# Patient Record
Sex: MALE | Race: WHITE | NOT HISPANIC OR LATINO | Employment: OTHER | URBAN - NONMETROPOLITAN AREA
[De-identification: names, ages, dates, MRNs, and addresses within clinical notes are randomized per-mention and may not be internally consistent; named-entity substitution may affect disease eponyms.]

---

## 2017-11-09 ENCOUNTER — OFFICE VISIT (OUTPATIENT)
Dept: SLEEP MEDICINE | Facility: HOSPITAL | Age: 77
End: 2017-11-09

## 2017-11-09 VITALS
DIASTOLIC BLOOD PRESSURE: 62 MMHG | SYSTOLIC BLOOD PRESSURE: 120 MMHG | HEIGHT: 68 IN | BODY MASS INDEX: 32.89 KG/M2 | WEIGHT: 217 LBS

## 2017-11-09 DIAGNOSIS — G47.33 OBSTRUCTIVE SLEEP APNEA, ADULT: Primary | ICD-10-CM

## 2017-11-09 PROCEDURE — 99213 OFFICE O/P EST LOW 20 MIN: CPT | Performed by: INTERNAL MEDICINE

## 2017-11-09 NOTE — PROGRESS NOTES
Sleep Clinic Follow Up    Date: 11/9/2017  Primary Care Physician: Pedro Capone MD      Interim History (1/3):  Since the last visit on 11/08/2016, patient has:      1)  CHAO - Has remained compliant with CPAP. He denies mask and machine issues, dry mouth, headaches, pressures intolerance, or non-compliance. He denies abnormal dreams, sleep paralysis, hypnagogic hallucinations, or cataplexy symptoms.     PAP Data:  Time frame: 06/07/2017 - 11/07/2017   Compliance 100 %  PAP range : 9 cm H2O  Average 90% pressure: 9 cmH2O  Leak: 0   Average AHI 0.8 events/hr  Mask type: nasal  DME: [facility]    Bed time: 5215-2047  Sleep latency: 30 minutes  Number of times awakens during the night: 0-2  Wake time: 9829-6596  Estimated total sleep time at night: 6 hours  Caffeine intake: 1-3 drinks  Alcohol intake: none  Nap time: none  Sleepiness with Driving: none    [place] - 4    PMHx, FH, SH reviewed and pertinent changes are:  unchanged from last office visit on 11/08/2016      REVIEW OF SYSTEMS:   Negative for chest pain, fever, chills, SOA, abdominal pain. Smoking: none      Exam (6-11/12):    Vitals:    11/09/17 1604   BP: 120/62     Body mass index is 32.99 kg/(m^2).  Gen:  No distress, conversant, pleasant, appears stated age, alert, oriented  Eyes:   Anicteric sclera, moist conjunctiva, no lid lag     PERRLA, EOMI   Heent:   NC/AT    Oropharynx clear, Mallampati 4    Grossly normal hearing    Lungs:  Normal effort, non-labored breathing    Clear to auscultation    CV:  Normal S1/S2, no murmur    no lower extremity edema  ABD:  Soft, normal bowel sounds       Psych:  Appropriate affect  Neuro:  CN 2-12 intact    Past Medical History:   Diagnosis Date   • Asthma    • Hypertension    • CHAO on CPAP        Current Outpatient Prescriptions:   •  atenolol (TENORMIN) 50 MG tablet, , Disp: , Rfl:   •  diazePAM (VALIUM) 2 MG tablet, , Disp: , Rfl:   •  fluticasone (FLONASE) 50 MCG/ACT nasal spray, , Disp: , Rfl:   •  tamsulosin  (FLOMAX) 0.4 MG capsule 24 hr capsule, , Disp: , Rfl:       ASSESSMENT / PLAN:     1. Obstructive sleep apnea  1. PSG on 05/08/20111, AHI of 38  2. CPAP titration on same day, recommended 9 cm H2O  3. Currently on 9 cm H2O  4. Continue PAP as prescribed.   5. Script for PAP supplies  6. Return to clinic in 1 year with compliance check unless sx change in the interim period.      Total time 15 min, more than half spent in face to face counseling and coordination of care.     This document has been electronically signed by David Sweeney MD on November 9, 2017         CC: Pedro Capone MD          No ref. provider found

## 2019-01-17 ENCOUNTER — OFFICE VISIT (OUTPATIENT)
Dept: SLEEP MEDICINE | Facility: HOSPITAL | Age: 79
End: 2019-01-17

## 2019-01-17 VITALS
BODY MASS INDEX: 32.13 KG/M2 | SYSTOLIC BLOOD PRESSURE: 132 MMHG | HEIGHT: 68 IN | DIASTOLIC BLOOD PRESSURE: 71 MMHG | HEART RATE: 69 BPM | WEIGHT: 212 LBS | OXYGEN SATURATION: 95 %

## 2019-01-17 DIAGNOSIS — G47.33 OBSTRUCTIVE SLEEP APNEA, ADULT: Primary | ICD-10-CM

## 2019-01-17 PROCEDURE — 99213 OFFICE O/P EST LOW 20 MIN: CPT | Performed by: INTERNAL MEDICINE

## 2019-01-17 RX ORDER — ATORVASTATIN CALCIUM 10 MG/1
10 TABLET, FILM COATED ORAL DAILY
COMMUNITY

## 2019-01-17 NOTE — PROGRESS NOTES
Sleep Clinic Follow Up    Date: 2019  Primary Care Physician: Pedro Capone MD    Last office visit: 2017 (I reviewed this note)    CC: Follow up: CHAO    Sleep Testin. PSG on , AHI of 38  2. CPAP titration on same day, recommended 9 cm H2O  3. Currently on 9 cm H2O      Assessment and Plan:    1. Obstructive sleep apnea Established, stable (1)  1. Compliant and improved with PAP therapy  2. Continue PAP as prescribed.   3. Script for PAP supplies  2. DM type 2  1. stable      Interim History (1-3/4):  Since the last visit:    1) severe CHAO -  Marcelo Araya Jr. has remained compliant with CPAP. He denies mask and machine issues, dry mouth, headaches, or pressures intolerance. He denies abnormal dreams, sleep paralysis, nasal congestion, URI sx.    PAP Data:    Time frame: 2018 - 2019   Compliance 99.6 %  Average use on days used: 7hrs 43 min  Percent of days with usage greater than or equal to 4 hours: 99.6%  PAP range : 9 cm H2O  Average 90% pressure: 9 cmH2O  Leak: <1 minutes  Average AHI 1.0 events/hr  Mask type: Full face mask  [facility]:     Bed time: 2300  Sleep latency: 60 minutes  Number of times awakens during the night: 1-2  Wake time: 2485-9587  Estimated total sleep time at night: 5-8 hours  Caffeine intake: 8oz of coffee, 24oz of tea, and 2oz of soda  Alcohol intake: 0 drinks per week  Nap time: 3x per week  Sleepiness with Driving: rare    [place] - 3    2) Patient denies RLS symptoms.     PMHx, FH, SH reviewed and pertinent changes are: started on lipitor, diagnosed with DM    REVIEW OF SYSTEMS:   Negative for chest pain, fever, cough, SOA, abdominal pain. Smoking:none      Exam ():  Vitals:    19 1447   BP: 132/71   Pulse: 69   SpO2: 95%     Body mass index is 32.24 kg/m². Patient's Body mass index is 32.24 kg/m². BMI is above normal parameters. Recommendations include: referral to primary care.      Gen:  No acute distress, alert,  oriented  Lungs:  CTA with normal effort   CV:  RRR, no M/R/G  GI:  soft, non-tender  Psych:  Appropriate affect        Past Medical History:   Diagnosis Date   • Asthma    • Hypertension    • CHAO on CPAP        Current Outpatient Medications:   •  atenolol (TENORMIN) 50 MG tablet, , Disp: , Rfl:   •  atorvastatin (LIPITOR) 10 MG tablet, Take 10 mg by mouth Daily., Disp: , Rfl:   •  diazePAM (VALIUM) 2 MG tablet, , Disp: , Rfl:   •  fluticasone (FLONASE) 50 MCG/ACT nasal spray, , Disp: , Rfl:   •  tamsulosin (FLOMAX) 0.4 MG capsule 24 hr capsule, , Disp: , Rfl:     Total time 15 min, more than half spent in face to face counseling and coordination of care.    RTC in 12 months     This document has been electronically signed by David Sweeney MD on January 17, 2019         CC: Pedro Capone MD          No ref. provider found

## 2020-01-28 ENCOUNTER — OFFICE VISIT (OUTPATIENT)
Dept: SLEEP MEDICINE | Facility: HOSPITAL | Age: 80
End: 2020-01-28

## 2020-01-28 VITALS
SYSTOLIC BLOOD PRESSURE: 109 MMHG | OXYGEN SATURATION: 98 % | BODY MASS INDEX: 28.64 KG/M2 | HEART RATE: 51 BPM | HEIGHT: 68 IN | DIASTOLIC BLOOD PRESSURE: 59 MMHG | WEIGHT: 189 LBS

## 2020-01-28 DIAGNOSIS — G47.33 OBSTRUCTIVE SLEEP APNEA, ADULT: Primary | ICD-10-CM

## 2020-01-28 PROCEDURE — 99213 OFFICE O/P EST LOW 20 MIN: CPT | Performed by: NURSE PRACTITIONER

## 2020-01-28 RX ORDER — ALBUTEROL SULFATE 90 UG/1
AEROSOL, METERED RESPIRATORY (INHALATION)
COMMUNITY
Start: 2020-01-02

## 2020-01-28 RX ORDER — RANITIDINE 150 MG/1
TABLET ORAL
COMMUNITY
Start: 2019-12-02

## 2020-01-28 RX ORDER — MECLIZINE HYDROCHLORIDE 25 MG/1
TABLET ORAL
COMMUNITY
Start: 2020-01-02

## 2020-01-28 RX ORDER — BUSPIRONE HYDROCHLORIDE 15 MG/1
TABLET ORAL
COMMUNITY
Start: 2019-12-02

## 2020-01-28 NOTE — PROGRESS NOTES
Sleep Clinic Follow Up    Date: 2020  Primary Care Physician: Pedro Capone MD    Last office visit: 2019 (I reviewed this note)    CC: Follow up: CHAO on CPAP      Interim History:  Since the last visit:    1) severe CHAO -  Marcelo Araya Jr. has remained compliant with CPAP. He denies mask and machine issues, dry mouth, headaches, or pressures intolerance. He denies abnormal dreams, sleep paralysis, nasal congestion, URI sx.    Sleep Testin. PSG on 2011, AHI of 38   2. CPAP titration on same day recommended 9 cm H2O   3. Currently on 9 cm H2O    PAP Data:    Time frame: 2019-2020   Compliance 99.7 %  Average use on days used: 7 hrs 27 min  Percent of days with usage greater than or equal to 4 hours: 99.2%  PAP range : 9 cm H2O  Average 90% pressure: 9 cmH2O  Leak: <1 minutes  Average AHI 2.5 events/hr  Mask type: Full face mask  [facility]:     Bed time: 9362-5758  Sleep latency: 30 minutes  Number of times awakens during the night: 2  Wake time: 0700  Estimated total sleep time at night: 6-8 hours  Caffeine intake: 1 cups of coffee, 3 cups of tea, and 0-1 diet sodas per day  Alcohol intake: 0 drinks per week  Nap time: occasionally, especially on Sundays   Sleepiness with Driving: none       2) Patient denies RLS symptoms.     PMHx, FH, SH reviewed and pertinent changes are: unchanged from last office visit on 2019 - other than asthma flare up      REVIEW OF SYSTEMS:   +SOB - at baseline  Negative for chest pain, SOA, fever, chills, cough, N/V/D, abdominal pain.    Smoking:none        Exam:  Vitals:    20 1118   BP: 109/59   Pulse: 51   SpO2: 98%           20  1118   Weight: 85.7 kg (189 lb)     Body mass index is 28.74 kg/m². Patient's Body mass index is 28.74 kg/m². BMI is above normal parameters. Recommendations include: referral to primary care.      Gen:                No distress, conversant, pleasant, appears stated age, alert, oriented  Eyes:                Anicteric sclera, moist conjunctiva, no lid lag                           PERRL, EOMI   Heent:             NC/AT                          Oropharynx clear                          Normal hearing  Lungs:             Normal effort, non-labored breathing                          Clear to auscultation bilaterally          CV:                  Normal S1/S2, no murmur                          No lower extremity edema  ABD:               Soft, rounded, non-distended                          Normal bowel sounds                    Psych:             Appropriate affect  Neuro:             CN 2-12 appear intact    Past Medical History:   Diagnosis Date   • Asthma    • Hypertension    • CHAO on CPAP        Current Outpatient Medications:   •  tiotropium bromide monohydrate (SPIRIVA RESPIMAT) 2.5 MCG/ACT aerosol solution inhaler, Inhale 2 puffs Daily., Disp: , Rfl:   •  atenolol (TENORMIN) 50 MG tablet, , Disp: , Rfl:   •  atorvastatin (LIPITOR) 10 MG tablet, Take 10 mg by mouth Daily., Disp: , Rfl:   •  busPIRone (BUSPAR) 15 MG tablet, , Disp: , Rfl:   •  diazePAM (VALIUM) 2 MG tablet, , Disp: , Rfl:   •  fluticasone (FLONASE) 50 MCG/ACT nasal spray, , Disp: , Rfl:   •  meclizine (ANTIVERT) 25 MG tablet, , Disp: , Rfl:   •  raNITIdine (ZANTAC) 150 MG tablet, , Disp: , Rfl:   •  tamsulosin (FLOMAX) 0.4 MG capsule 24 hr capsule, , Disp: , Rfl:   •  VENTOLIN  (90 Base) MCG/ACT inhaler, , Disp: , Rfl:       Assessment and Plan:    1. Obstructive sleep apnea Established, stable (1)  1. Compliant with PAP therapy  2. Continue PAP as prescribed  3. Script for PAP supplies  4. Discussed mask liners  5. Return to clinic in 1 year with compliance report unless change in symptoms in interim period        8 of 15 minutes spent face-to-face counseling patient extensively regarding:   PAP therapy, PAP compliance and PAP maintenance      RTC in 12 months. Patient agrees to return sooner if changes in symptoms.        This  document has been electronically signed by ABEL Cheng on January 28, 2020 11:22 AM          CC: Pedro Capone MD          No ref. provider found

## 2021-01-27 ENCOUNTER — OFFICE VISIT (OUTPATIENT)
Dept: SLEEP MEDICINE | Facility: HOSPITAL | Age: 81
End: 2021-01-27

## 2021-01-27 DIAGNOSIS — G47.33 OBSTRUCTIVE SLEEP APNEA, ADULT: Primary | ICD-10-CM

## 2021-01-27 PROCEDURE — 99441 PR PHYS/QHP TELEPHONE EVALUATION 5-10 MIN: CPT | Performed by: NURSE PRACTITIONER

## 2021-01-27 NOTE — PROGRESS NOTES
Sleep Clinic Follow Up - Telephone Visit      You have chosen to receive care through a telephone visit. Do you consent to use a telephone visit for your medical care today? Yes    Date: 2021  Primary Care Provider: Pedro Capone MD    Last office visit: 2020 (I reviewed this note)    CC: Follow up: CHAO on CPAP      Interim History:  Since the last visit:    1) severe CHAO -  Marcelo Araya Jr. has reportedly remained compliant with CPAP. He denies mask and machine issues, dry mouth, headaches, or pressures intolerance. He denies abnormal dreams, sleep paralysis, nasal congestion, URI sx.    Sleep Testin. PSG on 2011, AHI of 38   2. CPAP titration on same day, recommended 9 cm H2O   3. Currently on 9 cm H2O    PAP Data:  Patient to take data card to DME   Mask type: Full face mask  DME:       Bed time: 5868-7831  Sleep latency: 0-5 minutes  Number of times awakens during the night: 2  Wake time: 6080-8566  Estimated total sleep time at night: 7-8 hours  Caffeine intake: 0 cups of coffee, 0 cups of tea, and 0 sodas per day  Alcohol intake: 0 drinks per week  Nap time: some days, up to one hour    Sleepiness with Driving: denies          2) Patient denies RLS symptoms.     PMHx, FH, SH reviewed and pertinent changes are:  unchanged from last office visit on 2020      REVIEW OF SYSTEMS:   Negative for chest pain, SOA, fever, chills, cough, N/V/D, abdominal pain.    Smoking:none         Exam:  Unable to perform physical exam due to conducting telephone visit    Past Medical History:   Diagnosis Date   • Asthma    • Hypertension    • CHAO on CPAP        Current Outpatient Medications:   •  atenolol (TENORMIN) 50 MG tablet, , Disp: , Rfl:   •  atorvastatin (LIPITOR) 10 MG tablet, Take 10 mg by mouth Daily., Disp: , Rfl:   •  busPIRone (BUSPAR) 15 MG tablet, , Disp: , Rfl:   •  diazePAM (VALIUM) 2 MG tablet, , Disp: , Rfl:   •  fluticasone (FLONASE) 50 MCG/ACT nasal spray, , Disp: ,  Rfl:   •  meclizine (ANTIVERT) 25 MG tablet, , Disp: , Rfl:   •  raNITIdine (ZANTAC) 150 MG tablet, , Disp: , Rfl:   •  tamsulosin (FLOMAX) 0.4 MG capsule 24 hr capsule, , Disp: , Rfl:   •  tiotropium bromide monohydrate (SPIRIVA RESPIMAT) 2.5 MCG/ACT aerosol solution inhaler, Inhale 2 puffs Daily., Disp: , Rfl:   •  VENTOLIN  (90 Base) MCG/ACT inhaler, , Disp: , Rfl:       Assessment and Plan:    1. Obstructive sleep apnea - Established, stable (1)  1. Reportedly compliant with PAP therapy- patient to take data card to DME   2. Continue PAP as prescribed  3. Script for PAP supplies  4. Return to clinic in 12 months with compliance report unless change in symptoms in interim period        This visit has been rescheduled as a phone visit to comply with patient safety concerns in accordance with CDC recommendations. Total time of discussion was 5 minutes.    3 of 5 minutes spent telephone counseling patient extensively regarding:   PAP therapy, PAP compliance and PAP maintenance      RTC in 12 months. Patient agrees to return sooner if changes in symptoms.            This document has been electronically signed by ABEL Romeo on January 27, 2021 10:09 CST              CC: Pedro Capone MD          No ref. provider found

## 2021-02-05 ENCOUNTER — DOCUMENTATION (OUTPATIENT)
Dept: SLEEP MEDICINE | Facility: HOSPITAL | Age: 81
End: 2021-02-05

## 2021-02-05 NOTE — PROGRESS NOTES
PAP Data:    Time frame: 03/21/2020-04/19/2020   Compliance 100 %  Average use on days used: 7hrs 22 min  Percent of days with usage greater than or equal to 4 hours: 100%  PAP range : 9 cm H2O  Average 90% pressure: 9 cmH2O  Leak: 2 minutes  Average AHI 1.8 events/hr      This is the only pap data available at this time

## 2021-02-12 ENCOUNTER — DOCUMENTATION (OUTPATIENT)
Dept: SLEEP MEDICINE | Facility: HOSPITAL | Age: 81
End: 2021-02-12

## 2021-02-13 NOTE — PROGRESS NOTES
Did receive for compliance.  Unfortunately did with some history of but was received previously from the end of March to the middle of April 2020.  100% compliance with 100% 4 hours use with unremarkable residual AHI and unremarkable leak.  Unfortunately no more recent data.    We will have staff reach out to obtain compliance report.    Denny Baptiste II, MD  02/12/21 @ 6:13 PM CST

## 2021-03-18 ENCOUNTER — APPOINTMENT (OUTPATIENT)
Dept: VACCINE CLINIC | Facility: HOSPITAL | Age: 81
End: 2021-03-18

## 2021-03-19 ENCOUNTER — IMMUNIZATION (OUTPATIENT)
Dept: VACCINE CLINIC | Facility: HOSPITAL | Age: 81
End: 2021-03-19

## 2021-03-19 PROCEDURE — 91300 HC SARSCOV02 VAC 30MCG/0.3ML IM: CPT | Performed by: NURSE PRACTITIONER

## 2021-03-19 PROCEDURE — 0001A: CPT | Performed by: NURSE PRACTITIONER

## 2021-04-09 ENCOUNTER — IMMUNIZATION (OUTPATIENT)
Dept: VACCINE CLINIC | Facility: HOSPITAL | Age: 81
End: 2021-04-09

## 2021-04-09 PROCEDURE — 0002A: CPT | Performed by: THORACIC SURGERY (CARDIOTHORACIC VASCULAR SURGERY)

## 2021-04-09 PROCEDURE — 91300 HC SARSCOV02 VAC 30MCG/0.3ML IM: CPT | Performed by: THORACIC SURGERY (CARDIOTHORACIC VASCULAR SURGERY)

## 2021-07-20 DIAGNOSIS — G47.33 OSA (OBSTRUCTIVE SLEEP APNEA): Primary | ICD-10-CM

## 2021-07-30 DIAGNOSIS — G47.33 OSA (OBSTRUCTIVE SLEEP APNEA): Primary | ICD-10-CM

## 2022-01-17 ENCOUNTER — OFFICE VISIT (OUTPATIENT)
Dept: SLEEP MEDICINE | Facility: HOSPITAL | Age: 82
End: 2022-01-17

## 2022-01-17 VITALS
BODY MASS INDEX: 29.63 KG/M2 | WEIGHT: 195.5 LBS | HEART RATE: 69 BPM | SYSTOLIC BLOOD PRESSURE: 117 MMHG | DIASTOLIC BLOOD PRESSURE: 65 MMHG | HEIGHT: 68 IN | OXYGEN SATURATION: 98 %

## 2022-01-17 DIAGNOSIS — G47.33 OSA (OBSTRUCTIVE SLEEP APNEA): Primary | ICD-10-CM

## 2022-01-17 PROCEDURE — 99212 OFFICE O/P EST SF 10 MIN: CPT | Performed by: NURSE PRACTITIONER

## 2022-01-17 NOTE — PROGRESS NOTES
Sleep Clinic Follow Up    Date: 2022  Primary Care Provider: Pedro Capone MD    Last office visit: 2021 (I reviewed this note)    CC: Follow up: CHAO on CPAP, annual follow-up       Interim History:  Since the last visit:    1) severe CHAO -  Marcelo Araya Jr. has reportedly remained compliant with CPAP. He denies mask and machine issues, dry mouth, headaches, or pressures intolerance. He denies abnormal dreams, sleep paralysis, nasal congestion, URI sx.  His machine is part of [employer] safety recall. He has not yet registered machine. Should be eligible for new machine through insurance.     2) Patient denies RLS symptoms.       Sleep Testin. PSG on 2011, AHI of 38   2. CPAP titration on same day, recommended 9 cm H2O   3. Currently on 9 cm H2O    PAP Data:  No data available   Mask type: Full face mask  DME: [facility]    Bed time: 5165-0459  Sleep latency: 15-30 minutes  Number of times awakens during the night: 1-2  Wake time: 6685-6406  Estimated total sleep time at night: 7-8 hours  Caffeine intake: 1 cups of coffee, 1 cups of tea, and 0-1 sodas per day  Alcohol intake: 0 drinks per week  Nap time: rare   Sleepiness with Driving: denies      [place] - 3        PMHx, FH, SH reviewed and pertinent changes are:  unchanged from last office visit on 2021      REVIEW OF SYSTEMS:   Negative for chest pain, SOA, fever, chills, cough, N/V/D, abdominal pain.    Smoking:none         Exam:  Vitals:    22 09   BP: 117/65   Pulse: 69   SpO2: 98%           22   Weight: 88.7 kg (195 lb 8 oz)     Body mass index is 29.73 kg/m². Patient's Body mass index is 29.73 kg/m². indicating that he is overweight (BMI 25-29.9). Obesity-related health conditions include the following: obstructive sleep apnea. Obesity is unchanged. BMI is is above average; BMI management plan is completed. We discussed portion control and increasing exercise..      Gen:                No distress,  conversant, pleasant, appears stated age, alert, oriented  Eyes:               Anicteric sclera, moist conjunctiva, no lid lag                           EOMI   Lungs:             normal effort, non-labored breathing                          Clear to auscultation bilaterally          CV:                  Normal S1/S2, no murmur                          no lower extremity edema                 Psych:             Appropriate affect  Neuro:             CN 2-12 appear intact    Past Medical History:   Diagnosis Date   • Asthma    • Hypertension    • CHAO on CPAP        Current Outpatient Medications:   •  atenolol (TENORMIN) 50 MG tablet, , Disp: , Rfl:   •  atorvastatin (LIPITOR) 10 MG tablet, Take 10 mg by mouth Daily., Disp: , Rfl:   •  busPIRone (BUSPAR) 15 MG tablet, , Disp: , Rfl:   •  diazePAM (VALIUM) 2 MG tablet, , Disp: , Rfl:   •  fluticasone (FLONASE) 50 MCG/ACT nasal spray, , Disp: , Rfl:   •  meclizine (ANTIVERT) 25 MG tablet, , Disp: , Rfl:   •  raNITIdine (ZANTAC) 150 MG tablet, , Disp: , Rfl:   •  tamsulosin (FLOMAX) 0.4 MG capsule 24 hr capsule, , Disp: , Rfl:   •  tiotropium bromide monohydrate (SPIRIVA RESPIMAT) 2.5 MCG/ACT aerosol solution inhaler, Inhale 2 puffs Daily., Disp: , Rfl:   •  VENTOLIN  (90 Base) MCG/ACT inhaler, , Disp: , Rfl:       Assessment and Plan:    1. Obstructive sleep apnea  -Established, stable (1)  1. Compliant with PAP therapy- order for new CPAP at 9 cm H2O  2. Discussed with patient safety recall of [employer] CPAP/BiPAP/AVAPS machines. We discussed the risk versus benefit of continuing usage of PAP therapy. The company has recommended that patients stop usage of their current machines. Instructed patient to call [employer] ([phone]) to check if the machine is under warranty for repair or replacement. [place] machine with serial number on website: www.[employer]/src-updates. Follow up with DME company or sleep clinic regarding any further  questions. Order for new machine today. Advised patient to avoid unapproved ozone-type CPAP . Advised to call us if any further questions or problems as well.  3. Script for PAP supplies  4. Drowsy driving tips- do not drive if feeling sleepy   5. Return to clinic in 3 months with compliance report unless change in symptoms in interim period          I spent 15 minutes caring for Marcelo on this date of service. This time includes time spent by me in the following activities: preparing for the visit, obtaining and/or reviewing a separately obtained history, performing a medically appropriate examination and/or evaluation, counseling and educating the patient/family/caregiver, ordering medications, tests, or procedures and documenting information in the medical record.           This document has been electronically signed by ABEL Romeo on January 17, 2022 09:32 CST            CC: Pedro Capone MD          No ref. provider found

## 2022-07-05 DIAGNOSIS — G47.33 OSA (OBSTRUCTIVE SLEEP APNEA): Primary | ICD-10-CM

## 2022-07-28 ENCOUNTER — OFFICE VISIT (OUTPATIENT)
Dept: SLEEP MEDICINE | Facility: HOSPITAL | Age: 82
End: 2022-07-28

## 2022-07-28 VITALS
HEART RATE: 62 BPM | SYSTOLIC BLOOD PRESSURE: 119 MMHG | WEIGHT: 189.4 LBS | DIASTOLIC BLOOD PRESSURE: 67 MMHG | BODY MASS INDEX: 28.7 KG/M2 | OXYGEN SATURATION: 97 % | HEIGHT: 68 IN

## 2022-07-28 DIAGNOSIS — G47.33 OSA (OBSTRUCTIVE SLEEP APNEA): Primary | ICD-10-CM

## 2022-07-28 PROCEDURE — 99212 OFFICE O/P EST SF 10 MIN: CPT | Performed by: NURSE PRACTITIONER

## 2022-07-28 NOTE — PROGRESS NOTES
Sleep Clinic Follow Up    Date: 2022  Primary Care Provider: Pedro Capone MD    Last office visit: 2022 (I reviewed this note)    CC: Follow up: CHAO on CPAP, needs new machine       Interim History:  Since the last visit:    1) severe CHAO -  Marcelo Araya Jr. has not remained compliant with CPAP.    His machine is recalled. Has not gotten replacement.     Current machine- Humidifier does not work and machine will not turn on half the time. He is eligible for new machine through insurance. Wants new machine.     2) Patient denies RLS symptoms.       Sleep Testin. PSG on 2011, AHI of 38   2. CPAP titration on same day, recommended 9 cm H2O   3. Currently on 9 cm H2O    PAP Data:  No data   Mask type: Full face mask  DME: [facility]     Bed time: 8635-6836  Sleep latency: 30 minutes  Number of times awakens during the night: 1-2  Wake time: 0530  Estimated total sleep time at night: 5-7 hours  Caffeine intake: 1 cups of coffee, 1 cups of tea, and 0-1 sodas per day  Alcohol intake: 0 drinks per week  Nap time: rare   Sleepiness with Driving: denies      [place] - 4        PMHx, FH, SH reviewed and pertinent changes are:  unchanged from last office visit on 2022      REVIEW OF SYSTEMS:   Negative for chest pain, SOA, fever, chills, cough, N/V/D, abdominal pain.    Smoking:none           Exam:  Vitals:    22 09   BP: 119/67   Pulse: 62   SpO2: 97%           22  09   Weight: 85.9 kg (189 lb 6.4 oz)     Body mass index is 28.8 kg/m². BMI is >= 25 and <30. (Overweight) The following options were offered after discussion;: nutrition counseling/recommendations      Gen:                No distress, conversant, pleasant, appears stated age, alert, oriented  Eyes:               Anicteric sclera, moist conjunctiva, no lid lag                           EOMI   Lungs:             normal effort, non-labored breathing                          Clear to auscultation bilaterally           CV:                  Normal S1/S2, no murmur                          no lower extremity edema                 Psych:             Appropriate affect  Neuro:             CN 2-12 appear intact    Past Medical History:   Diagnosis Date   • Asthma    • Hypertension    • CHAO on CPAP        Current Outpatient Medications:   •  atenolol (TENORMIN) 50 MG tablet, , Disp: , Rfl:   •  atorvastatin (LIPITOR) 10 MG tablet, Take 10 mg by mouth Daily., Disp: , Rfl:   •  busPIRone (BUSPAR) 15 MG tablet, , Disp: , Rfl:   •  diazePAM (VALIUM) 2 MG tablet, , Disp: , Rfl:   •  fluticasone (FLONASE) 50 MCG/ACT nasal spray, , Disp: , Rfl:   •  meclizine (ANTIVERT) 25 MG tablet, , Disp: , Rfl:   •  raNITIdine (ZANTAC) 150 MG tablet, , Disp: , Rfl:   •  tamsulosin (FLOMAX) 0.4 MG capsule 24 hr capsule, , Disp: , Rfl:   •  tiotropium bromide monohydrate (SPIRIVA RESPIMAT) 2.5 MCG/ACT aerosol solution inhaler, Inhale 2 puffs Daily., Disp: , Rfl:   •  VENTOLIN  (90 Base) MCG/ACT inhaler, , Disp: , Rfl:       Assessment and Plan:    1. Obstructive sleep apnea    1. Order for new CPAP at 9 cm H2O with mask and supplies   2. Continue PAP as prescribed  3. Script for PAP supplies  4. Drowsy driving tips- do not drive if feeling sleepy   5. Return to clinic in 2-3 months with compliance report unless change in symptoms in interim period          I spent 12 minutes caring for Marcelo on this date of service. This time includes time spent by me in the following activities: preparing for the visit, obtaining and/or reviewing a separately obtained history, performing a medically appropriate examination and/or evaluation, counseling and educating the patient/family/caregiver, ordering medications, tests, or procedures and documenting information in the medical record.           This document has been electronically signed by ABEL Romeo on July 28, 2022 09:09 CDT            CC: Pedro Capone MD          No ref. provider found

## 2022-10-04 ENCOUNTER — OFFICE VISIT (OUTPATIENT)
Dept: SLEEP MEDICINE | Facility: HOSPITAL | Age: 82
End: 2022-10-04

## 2022-10-04 VITALS
HEIGHT: 68 IN | HEART RATE: 56 BPM | WEIGHT: 189 LBS | SYSTOLIC BLOOD PRESSURE: 118 MMHG | DIASTOLIC BLOOD PRESSURE: 63 MMHG | OXYGEN SATURATION: 96 % | BODY MASS INDEX: 28.64 KG/M2

## 2022-10-04 DIAGNOSIS — G47.33 OSA ON CPAP: Primary | ICD-10-CM

## 2022-10-04 DIAGNOSIS — Z99.89 OSA ON CPAP: Primary | ICD-10-CM

## 2022-10-04 PROCEDURE — 99212 OFFICE O/P EST SF 10 MIN: CPT | Performed by: NURSE PRACTITIONER

## 2022-10-04 NOTE — PROGRESS NOTES
Sleep Clinic Follow Up    Date: 10/4/2022  Primary Care Provider: Pedro Capone MD    Last office visit: 2022 (I reviewed this note)    CC: Follow up: CHAO on CPAP, new machine       Interim History:  Since the last visit:    1) severe CHAO -  Marcelo Araya Jr. has remained compliant with CPAP. He denies mask and machine issues, dry mouth, headaches, or pressures intolerance. He denies abnormal dreams, sleep paralysis, nasal congestion, URI sx.  Overall doing well. Likes new machine.     2) Patient denies RLS symptoms.     Sleep Testin. PSG on 2011, AHI of 38   2. CPAP titration on same day, recommended 9 cm H2O   3. Currently on 9 cm H2O    PAP Data:    Time frame: 2022   Compliance: 98.3 %  Average use on days used: 7hrs 8 min  Percent of days with usage greater than or equal to 4 hours: 98.3%  PAP range: 9 cm H2O  Average 90% pressure: 9 cmH2O  Leak: 0 minutes  Average AHI: 2.0 events/hr  Mask type: Full face mask  DME: [facility]     Bed time: 6024-9870  Sleep latency: 15 minutes  Number of times awakens during the night: 1-2  Wake time: 7657-2778  Estimated total sleep time at night: 6-8 hours  Caffeine intake: 1 cups of coffee, 1 cups of tea, and 0-1 sodas per day  Alcohol intake: 0 drinks per week  Nap time: rare   Sleepiness with Driving: denies      Avella - 4        PMHx, FH, SH reviewed and pertinent changes are:  unchanged from last office visit on 2022      REVIEW OF SYSTEMS:   Negative for chest pain, SOA, fever, chills, cough, N/V/D, abdominal pain.    Smoking:none       Exam:  Vitals:    10/04/22 1116   BP: 118/63   Pulse: 56   SpO2: 96%           10/04/22  1116   Weight: 85.7 kg (189 lb)     Body mass index is 28.74 kg/m². BMI is >= 25 and <30. (Overweight) The following options were offered after discussion;: nutrition counseling/recommendations      Gen:                No distress, conversant, pleasant, appears stated age, alert, oriented  Eyes:                Anicteric sclera, moist conjunctiva, no lid lag                           EOMI   Lungs:             normal effort, non-labored breathing                            CV:                                           no lower extremity edema                 Psych:             Appropriate affect  Neuro:             CN 2-12 appear intact    Past Medical History:   Diagnosis Date   • Asthma    • Hypertension    • CHAO on CPAP        Current Outpatient Medications:   •  atenolol (TENORMIN) 50 MG tablet, , Disp: , Rfl:   •  atorvastatin (LIPITOR) 10 MG tablet, Take 10 mg by mouth Daily., Disp: , Rfl:   •  busPIRone (BUSPAR) 15 MG tablet, , Disp: , Rfl:   •  diazePAM (VALIUM) 2 MG tablet, , Disp: , Rfl:   •  fluticasone (FLONASE) 50 MCG/ACT nasal spray, , Disp: , Rfl:   •  meclizine (ANTIVERT) 25 MG tablet, , Disp: , Rfl:   •  raNITIdine (ZANTAC) 150 MG tablet, , Disp: , Rfl:   •  tamsulosin (FLOMAX) 0.4 MG capsule 24 hr capsule, , Disp: , Rfl:   •  tiotropium bromide monohydrate (SPIRIVA RESPIMAT) 2.5 MCG/ACT aerosol solution inhaler, Inhale 2 puffs Daily., Disp: , Rfl:   •  VENTOLIN  (90 Base) MCG/ACT inhaler, , Disp: , Rfl:       Assessment and Plan:    1. Obstructive sleep apnea    1. Compliant with PAP therapy  2. Continue PAP as prescribed  3. Script for PAP supplies  4. Drowsy driving tips- do not drive if feeling sleepy   5. Return to clinic in 12 months with compliance report unless change in symptoms in interim period          I spent 10 minutes caring for Marcelo on this date of service. This time includes time spent by me in the following activities: preparing for the visit, obtaining and/or reviewing a separately obtained history, performing a medically appropriate examination and/or evaluation, counseling and educating the patient/family/caregiver, ordering medications, tests, or procedures and documenting information in the medical record.           This document has been electronically signed by Mary GARRIDO  ABEL Denny on October 4, 2022 11:18 CDT            CC: Pedro Capone MD          No ref. provider found